# Patient Record
Sex: FEMALE | URBAN - METROPOLITAN AREA
[De-identification: names, ages, dates, MRNs, and addresses within clinical notes are randomized per-mention and may not be internally consistent; named-entity substitution may affect disease eponyms.]

---

## 2018-07-20 ENCOUNTER — APPOINTMENT (RX ONLY)
Dept: URBAN - METROPOLITAN AREA CLINIC 157 | Facility: CLINIC | Age: 83
Setting detail: DERMATOLOGY
End: 2018-07-20

## 2018-07-20 DIAGNOSIS — L57.0 ACTINIC KERATOSIS: ICD-10-CM

## 2018-07-20 DIAGNOSIS — Z85.828 PERSONAL HISTORY OF OTHER MALIGNANT NEOPLASM OF SKIN: ICD-10-CM

## 2018-07-20 PROBLEM — E78.5 HYPERLIPIDEMIA, UNSPECIFIED: Status: ACTIVE | Noted: 2018-07-20

## 2018-07-20 PROBLEM — I10 ESSENTIAL (PRIMARY) HYPERTENSION: Status: ACTIVE | Noted: 2018-07-20

## 2018-07-20 PROBLEM — D48.5 NEOPLASM OF UNCERTAIN BEHAVIOR OF SKIN: Status: ACTIVE | Noted: 2018-07-20

## 2018-07-20 PROBLEM — E13.9 OTHER SPECIFIED DIABETES MELLITUS WITHOUT COMPLICATIONS: Status: ACTIVE | Noted: 2018-07-20

## 2018-07-20 PROCEDURE — 17000 DESTRUCT PREMALG LESION: CPT

## 2018-07-20 PROCEDURE — ? COUNSELING

## 2018-07-20 PROCEDURE — 99201: CPT | Mod: 25

## 2018-07-20 PROCEDURE — ? BIOPSY BY SHAVE METHOD

## 2018-07-20 PROCEDURE — ? LIQUID NITROGEN

## 2018-07-20 PROCEDURE — 11100: CPT | Mod: 59

## 2018-07-20 ASSESSMENT — LOCATION SIMPLE DESCRIPTION DERM: LOCATION SIMPLE: RIGHT FOREHEAD

## 2018-07-20 ASSESSMENT — LOCATION DETAILED DESCRIPTION DERM: LOCATION DETAILED: RIGHT INFERIOR LATERAL FOREHEAD

## 2018-07-20 ASSESSMENT — LOCATION ZONE DERM: LOCATION ZONE: FACE

## 2018-07-20 NOTE — PROCEDURE: BIOPSY BY SHAVE METHOD
Post-Care Instructions: I reviewed with the patient in detail post-care instructions. Patient is to keep the biopsy site dry overnight, and then apply bacitracin twice daily until healed. Patient may apply hydrogen peroxide soaks to remove any crusting.
Additional Anesthesia Volume In Cc (Will Not Render If 0): 0
Dressing: Band-Aid
Anesthesia Type: 1% lidocaine with epinephrine
Bill 55834 For Specimen Handling/Conveyance To Laboratory?: no
Electrodesiccation Text: The wound bed was treated with electrodesiccation after the biopsy was performed.
Curettage Text: The wound bed was treated with curettage after the biopsy was performed.
Lab: 3
Type Of Destruction Used: Curettage
Biopsy Type: H and E
Notification Instructions: Patient will be notified of biopsy results. However, patient instructed to call the office if not contacted within 2 weeks.
Was A Bandage Applied: Yes
Wound Care: Petrolatum
Cryotherapy Text: The wound bed was treated with cryotherapy after the biopsy was performed.
Electrodesiccation And Curettage Text: The wound bed was treated with electrodesiccation and curettage after the biopsy was performed.
Detail Level: Detailed
Depth Of Biopsy: dermis
Biopsy Method: double edge Personna blade
Billing Type: Third-Party Bill
Anesthesia Volume In Cc (Will Not Render If 0): 1
Consent: Written consent was obtained and risks were reviewed including but not limited to scarring, infection, bleeding, scabbing, incomplete removal, nerve damage and allergy to anesthesia.
Silver Nitrate Text: The wound bed was treated with silver nitrate after the biopsy was performed.
Hemostasis: Aluminum Chloride

## 2018-07-20 NOTE — PROCEDURE: LIQUID NITROGEN
Duration Of Freeze Thaw-Cycle (Seconds): 0
Post-Care Instructions: I reviewed with the patient in detail post-care instructions. Patient is to wear sunprotection, and avoid picking at any of the treated lesions. Pt may apply Vaseline to crusted or scabbing areas.
Number Of Freeze-Thaw Cycles: 2 freeze-thaw cycles
Consent: The patient's consent was obtained including but not limited to risks of crusting, scabbing, blistering, scarring, darker or lighter pigmentary change, recurrence, incomplete removal and infection.
Render Post-Care Instructions In Note?: no
Detail Level: Detailed

## 2018-07-20 NOTE — HPI: SKIN LESION
Is This A New Presentation, Or A Follow-Up?: Skin Lesion
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 This spot is just below Mohs site per pt.

## 2018-08-21 ENCOUNTER — APPOINTMENT (RX ONLY)
Dept: URBAN - METROPOLITAN AREA CLINIC 157 | Facility: CLINIC | Age: 83
Setting detail: DERMATOLOGY
End: 2018-08-21

## 2018-08-21 DIAGNOSIS — L81.4 OTHER MELANIN HYPERPIGMENTATION: ICD-10-CM

## 2018-08-21 DIAGNOSIS — L57.0 ACTINIC KERATOSIS: ICD-10-CM

## 2018-08-21 PROBLEM — C44.319 BASAL CELL CARCINOMA OF SKIN OF OTHER PARTS OF FACE: Status: ACTIVE | Noted: 2018-08-21

## 2018-08-21 PROCEDURE — ? DEFER

## 2018-08-21 PROCEDURE — ? COUNSELING

## 2018-08-21 PROCEDURE — 17000 DESTRUCT PREMALG LESION: CPT

## 2018-08-21 PROCEDURE — ? LIQUID NITROGEN

## 2018-08-21 PROCEDURE — 99213 OFFICE O/P EST LOW 20 MIN: CPT | Mod: 25

## 2018-08-21 ASSESSMENT — LOCATION SIMPLE DESCRIPTION DERM
LOCATION SIMPLE: CHEST
LOCATION SIMPLE: UPPER BACK
LOCATION SIMPLE: RIGHT FOREHEAD

## 2018-08-21 ASSESSMENT — LOCATION DETAILED DESCRIPTION DERM
LOCATION DETAILED: SUPERIOR THORACIC SPINE
LOCATION DETAILED: RIGHT FOREHEAD
LOCATION DETAILED: RIGHT LATERAL SUPERIOR CHEST

## 2018-08-21 ASSESSMENT — LOCATION ZONE DERM
LOCATION ZONE: FACE
LOCATION ZONE: TRUNK

## 2018-08-21 NOTE — PROCEDURE: LIQUID NITROGEN
Number Of Freeze-Thaw Cycles: 2 freeze-thaw cycles
Post-Care Instructions: I reviewed with the patient in detail post-care instructions. Patient is to wear sunprotection, and avoid picking at any of the treated lesions. Pt may apply Vaseline to crusted or scabbing areas.
Duration Of Freeze Thaw-Cycle (Seconds): 0
Consent: The patient's consent was obtained including but not limited to risks of crusting, scabbing, blistering, scarring, darker or lighter pigmentary change, recurrence, incomplete removal and infection.
Detail Level: Detailed
Render Post-Care Instructions In Note?: no

## 2018-08-23 ENCOUNTER — APPOINTMENT (RX ONLY)
Dept: URBAN - METROPOLITAN AREA CLINIC 156 | Facility: CLINIC | Age: 83
Setting detail: DERMATOLOGY
End: 2018-08-23

## 2018-08-23 VITALS — DIASTOLIC BLOOD PRESSURE: 70 MMHG | HEART RATE: 78 BPM | RESPIRATION RATE: 14 BRPM | SYSTOLIC BLOOD PRESSURE: 118 MMHG

## 2018-08-23 PROBLEM — D48.5 NEOPLASM OF UNCERTAIN BEHAVIOR OF SKIN: Status: ACTIVE | Noted: 2018-08-23

## 2018-08-23 PROBLEM — C44.319 BASAL CELL CARCINOMA OF SKIN OF OTHER PARTS OF FACE: Status: ACTIVE | Noted: 2018-08-23

## 2018-08-23 PROCEDURE — ? BIOPSY BY SHAVE METHOD

## 2018-08-23 PROCEDURE — ? CONSULTATION FOR MOHS SURGERY

## 2018-08-23 PROCEDURE — 11101: CPT | Mod: 79

## 2018-08-23 PROCEDURE — 99213 OFFICE O/P EST LOW 20 MIN: CPT | Mod: 24,25

## 2018-08-23 PROCEDURE — 11100: CPT | Mod: 79

## 2018-08-23 NOTE — PROCEDURE: BIOPSY BY SHAVE METHOD
Triangulation (Location Of Lesion In Relation To Distance From Anatomical Landmarks): Lesion placed in correctly labeled bottle and verified by provider.
Depth Of Biopsy: dermis
Body Location Override (Optional - Billing Will Still Be Based On Selected Body Map Location If Applicable): left temple
Type Of Destruction Used: Curettage
Biopsy Method: Dermablade
Hemostasis: Electrocautery
Additional Anesthesia Volume In Cc (Will Not Render If 0): 0
Path Notes (To The Dermatopathologist): 0.9 x 0.7cm
Biopsy Type: H and E
Bill [id number] For Specimen Handling/Conveyance To Laboratory?: no
Was A Bandage Applied: Yes
Anesthesia Type: 1% lidocaine with epinephrine
Lab Facility: 1
Lab: 3
Post-Care Instructions: I reviewed with the patient in detail post-care instructions. Patient is to keep the biopsy site dry overnight, and then apply bacitracin twice daily until healed. Patient may apply hydrogen peroxide soaks to remove any crusting.
Wound Care: Petrolatum
Electrodesiccation Text: The wound bed was treated with electrodesiccation after the biopsy was performed.
Anesthesia Volume In Cc (Will Not Render If 0): 0.5
Cryotherapy Text: The wound bed was treated with cryotherapy after the biopsy was performed.
Size Of Lesion In Cm: 0.9
Dressing: bandage
Billing Type: Third-Party Bill
Notification Instructions: Patient will be notified of biopsy results. However, patient instructed to call the office if not contacted within 2 weeks.
Consent: Written consent was obtained and risks were reviewed including but not limited to scarring, infection, bleeding, scabbing, incomplete removal, nerve damage and allergy to anesthesia.
Curettage Text: The wound bed was treated with curettage after the biopsy was performed.
Detail Level: Detailed
Electrodesiccation And Curettage Text: The wound bed was treated with electrodesiccation and curettage after the biopsy was performed.
X Size Of Lesion In Cm: 0.7
Silver Nitrate Text: The wound bed was treated with silver nitrate after the biopsy was performed.
Path Notes (To The Dermatopathologist): 1.0 x 0.8cm
X Size Of Lesion In Cm: 0.8
Body Location Override (Optional - Billing Will Still Be Based On Selected Body Map Location If Applicable): left lateral brow
Body Location Override (Optional - Billing Will Still Be Based On Selected Body Map Location If Applicable): left lateral canthus
Path Notes (To The Dermatopathologist): 0.8 x 0.5 cm
Path Notes (To The Dermatopathologist): 1.1 x 1.0cm
Body Location Override (Optional - Billing Will Still Be Based On Selected Body Map Location If Applicable): left lateral forehead
Size Of Lesion In Cm: 1.1

## 2018-08-23 NOTE — HPI: MOHS SURGERY CONSULTATION
Has The Cancer Been Biopsied Before?: has been previously biopsied
Body Location Override (Optional): Left lateral temple
Who Is Your Referring Provider?: BERTA BA

## 2018-08-23 NOTE — PROCEDURE: CONSULTATION FOR MOHS SURGERY
Body Location Override (Optional - Billing Will Still Be Based On Selected Body Map Location If Applicable): left lateral temple
Size Of Lesion: 4.3
Location Indication Override (Is Already Calculated Based On Selected Body Location): Area H
Name Of The Referring Provider For Procedure: Felipe
Incorporate Mauc In Note: Yes
X Size Of Lesion In Cm (Optional): 2
Detail Level: Detailed

## 2018-09-13 ENCOUNTER — APPOINTMENT (RX ONLY)
Dept: URBAN - METROPOLITAN AREA CLINIC 156 | Facility: CLINIC | Age: 83
Setting detail: DERMATOLOGY
End: 2018-09-13

## 2018-09-13 DIAGNOSIS — L57.0 ACTINIC KERATOSIS: ICD-10-CM

## 2018-09-13 PROBLEM — C44.319 BASAL CELL CARCINOMA OF SKIN OF OTHER PARTS OF FACE: Status: ACTIVE | Noted: 2018-09-13

## 2018-09-13 PROBLEM — C44.329 SQUAMOUS CELL CARCINOMA OF SKIN OF OTHER PARTS OF FACE: Status: ACTIVE | Noted: 2018-09-13

## 2018-09-13 PROBLEM — D04.39 CARCINOMA IN SITU OF SKIN OF OTHER PARTS OF FACE: Status: ACTIVE | Noted: 2018-09-13

## 2018-09-13 PROCEDURE — 99213 OFFICE O/P EST LOW 20 MIN: CPT

## 2018-09-13 PROCEDURE — ? DEFER

## 2018-09-13 PROCEDURE — ? CONSULTATION FOR MOHS SURGERY

## 2018-09-13 ASSESSMENT — LOCATION ZONE DERM: LOCATION ZONE: FACE

## 2018-09-13 ASSESSMENT — LOCATION DETAILED DESCRIPTION DERM
LOCATION DETAILED: LEFT CENTRAL ZYGOMA
LOCATION DETAILED: LEFT MEDIAL ZYGOMA

## 2018-09-13 ASSESSMENT — LOCATION SIMPLE DESCRIPTION DERM: LOCATION SIMPLE: LEFT ZYGOMA

## 2018-09-13 NOTE — PROCEDURE: DEFER
Body Location Override (Optional - Billing Will Still Be Based On Selected Body Map Location If Applicable): left temple
Procedure To Be Performed At Next Visit: Cryotherapy
Detail Level: Detailed
Body Location Override (Optional - Billing Will Still Be Based On Selected Body Map Location If Applicable): left lateral canthus

## 2018-09-13 NOTE — PROCEDURE: CONSULTATION FOR MOHS SURGERY
Incorporate Mauc In Note: Yes
Location Indication Override (Is Already Calculated Based On Selected Body Location): Area H
Size Of Lesion: 1
Detail Level: Detailed
X Size Of Lesion In Cm (Optional): 0.8
Body Location Override (Optional - Billing Will Still Be Based On Selected Body Map Location If Applicable): left lateral brow
Size Of Lesion: 1.1
Location Indication Override (Is Already Calculated Based On Selected Body Location): Area M
Body Location Override (Optional - Billing Will Still Be Based On Selected Body Map Location If Applicable): left lateral forehead
Body Location Override (Optional - Billing Will Still Be Based On Selected Body Map Location If Applicable): left lateral temple
Size Of Lesion: 4.3
Name Of The Referring Provider For Procedure: ANTHONY Wang
X Size Of Lesion In Cm (Optional): 2

## 2018-10-17 ENCOUNTER — APPOINTMENT (RX ONLY)
Dept: URBAN - METROPOLITAN AREA CLINIC 156 | Facility: CLINIC | Age: 83
Setting detail: DERMATOLOGY
End: 2018-10-17

## 2018-10-17 VITALS — HEART RATE: 82 BPM | SYSTOLIC BLOOD PRESSURE: 120 MMHG | RESPIRATION RATE: 16 BRPM | DIASTOLIC BLOOD PRESSURE: 78 MMHG

## 2018-10-17 DIAGNOSIS — L57.0 ACTINIC KERATOSIS: ICD-10-CM

## 2018-10-17 PROBLEM — D04.39 CARCINOMA IN SITU OF SKIN OF OTHER PARTS OF FACE: Status: ACTIVE | Noted: 2018-10-17

## 2018-10-17 PROBLEM — C44.329 SQUAMOUS CELL CARCINOMA OF SKIN OF OTHER PARTS OF FACE: Status: ACTIVE | Noted: 2018-10-17

## 2018-10-17 PROBLEM — C44.319 BASAL CELL CARCINOMA OF SKIN OF OTHER PARTS OF FACE: Status: ACTIVE | Noted: 2018-10-17

## 2018-10-17 PROCEDURE — ? DEFER

## 2018-10-17 PROCEDURE — ? CONSULTATION FOR MOHS SURGERY

## 2018-10-17 PROCEDURE — 99213 OFFICE O/P EST LOW 20 MIN: CPT

## 2018-10-17 ASSESSMENT — LOCATION ZONE DERM: LOCATION ZONE: FACE

## 2018-10-17 ASSESSMENT — LOCATION DETAILED DESCRIPTION DERM
LOCATION DETAILED: LEFT MID TEMPLE
LOCATION DETAILED: LEFT INFERIOR TEMPLE

## 2018-10-17 ASSESSMENT — LOCATION SIMPLE DESCRIPTION DERM: LOCATION SIMPLE: LEFT TEMPLE

## 2018-10-17 NOTE — PROCEDURE: CONSULTATION FOR MOHS SURGERY
Location Indication Override (Is Already Calculated Based On Selected Body Location): Area H
Size Of Lesion: 4.3
Incorporate Mauc In Note: Yes
Detail Level: Detailed
X Size Of Lesion In Cm (Optional): 2
Body Location Override (Optional - Billing Will Still Be Based On Selected Body Map Location If Applicable): left lateral temple
Body Location Override (Optional - Billing Will Still Be Based On Selected Body Map Location If Applicable): left lateral forehead
Size Of Lesion: 1.1
X Size Of Lesion In Cm (Optional): 1
Location Indication Override (Is Already Calculated Based On Selected Body Location): Area M
Body Location Override (Optional - Billing Will Still Be Based On Selected Body Map Location If Applicable): left lateral brow
X Size Of Lesion In Cm (Optional): 0.8

## 2018-10-17 NOTE — PROCEDURE: DEFER
Procedure To Be Performed At Next Visit: Liquid nitrogen
Detail Level: Detailed
Body Location Override (Optional - Billing Will Still Be Based On Selected Body Map Location If Applicable): left lateral canthus
Body Location Override (Optional - Billing Will Still Be Based On Selected Body Map Location If Applicable): left temple

## 2018-10-25 ENCOUNTER — APPOINTMENT (RX ONLY)
Dept: URBAN - METROPOLITAN AREA CLINIC 156 | Facility: CLINIC | Age: 83
Setting detail: DERMATOLOGY
End: 2018-10-25

## 2018-10-25 DIAGNOSIS — L57.0 ACTINIC KERATOSIS: ICD-10-CM

## 2018-10-25 PROCEDURE — 17003 DESTRUCT PREMALG LES 2-14: CPT

## 2018-10-25 PROCEDURE — 17000 DESTRUCT PREMALG LESION: CPT

## 2018-10-25 PROCEDURE — ? LIQUID NITROGEN

## 2018-10-25 ASSESSMENT — LOCATION SIMPLE DESCRIPTION DERM
LOCATION SIMPLE: LEFT TEMPLE
LOCATION SIMPLE: LEFT LATERAL CANTHUS

## 2018-10-25 ASSESSMENT — LOCATION ZONE DERM
LOCATION ZONE: FACE
LOCATION ZONE: EYELID

## 2018-10-25 ASSESSMENT — LOCATION DETAILED DESCRIPTION DERM
LOCATION DETAILED: LEFT LATERAL CANTHUS
LOCATION DETAILED: LEFT CENTRAL TEMPLE

## 2021-06-02 ENCOUNTER — APPOINTMENT (RX ONLY)
Dept: URBAN - METROPOLITAN AREA CLINIC 156 | Facility: CLINIC | Age: 86
Setting detail: DERMATOLOGY
End: 2021-06-02

## 2021-06-02 DIAGNOSIS — L24.4 IRRITANT CONTACT DERMATITIS DUE TO DRUGS IN CONTACT WITH SKIN: ICD-10-CM

## 2021-06-02 PROCEDURE — ? PRESCRIPTION MEDICATION MANAGEMENT

## 2021-06-02 PROCEDURE — ? COUNSELING

## 2021-06-02 PROCEDURE — 99213 OFFICE O/P EST LOW 20 MIN: CPT

## 2021-06-02 PROCEDURE — ? MEDICATION COUNSELING

## 2021-06-02 PROCEDURE — ? ADDITIONAL NOTES

## 2021-06-02 PROCEDURE — ? PRESCRIPTION

## 2021-06-02 RX ORDER — HYDROCORTISONE 25 MG/G
CREAM TOPICAL
Qty: 1 | Refills: 1 | Status: ERX | COMMUNITY
Start: 2021-06-02

## 2021-06-02 RX ADMIN — HYDROCORTISONE: 25 CREAM TOPICAL at 00:00

## 2021-06-02 ASSESSMENT — LOCATION DETAILED DESCRIPTION DERM: LOCATION DETAILED: NASAL DORSUM

## 2021-06-02 ASSESSMENT — LOCATION ZONE DERM: LOCATION ZONE: NOSE

## 2021-06-02 ASSESSMENT — LOCATION SIMPLE DESCRIPTION DERM: LOCATION SIMPLE: NOSE

## 2021-06-02 NOTE — PROCEDURE: MEDICATION COUNSELING
Xelkendellz Pregnancy And Lactation Text: This medication is Pregnancy Category D and is not considered safe during pregnancy.  The risk during breast feeding is also uncertain.

## 2023-01-16 ENCOUNTER — APPOINTMENT (RX ONLY)
Dept: URBAN - METROPOLITAN AREA CLINIC 156 | Facility: CLINIC | Age: 88
Setting detail: DERMATOLOGY
End: 2023-01-16

## 2023-01-16 DIAGNOSIS — L81.4 OTHER MELANIN HYPERPIGMENTATION: ICD-10-CM

## 2023-01-16 DIAGNOSIS — L21.8 OTHER SEBORRHEIC DERMATITIS: ICD-10-CM | Status: INADEQUATELY CONTROLLED

## 2023-01-16 DIAGNOSIS — L57.0 ACTINIC KERATOSIS: ICD-10-CM

## 2023-01-16 PROBLEM — D48.5 NEOPLASM OF UNCERTAIN BEHAVIOR OF SKIN: Status: ACTIVE | Noted: 2023-01-16

## 2023-01-16 PROCEDURE — ? LIQUID NITROGEN

## 2023-01-16 PROCEDURE — 11102 TANGNTL BX SKIN SINGLE LES: CPT

## 2023-01-16 PROCEDURE — 17003 DESTRUCT PREMALG LES 2-14: CPT

## 2023-01-16 PROCEDURE — ? BIOPSY BY SHAVE METHOD

## 2023-01-16 PROCEDURE — ? COUNSELING

## 2023-01-16 PROCEDURE — ? PRESCRIPTION MEDICATION MANAGEMENT

## 2023-01-16 PROCEDURE — 17000 DESTRUCT PREMALG LESION: CPT | Mod: 59

## 2023-01-16 PROCEDURE — ? PRESCRIPTION

## 2023-01-16 PROCEDURE — ? ADDITIONAL NOTES

## 2023-01-16 PROCEDURE — 99214 OFFICE O/P EST MOD 30 MIN: CPT | Mod: 25

## 2023-01-16 PROCEDURE — ? SUNSCREEN RECOMMENDATIONS

## 2023-01-16 RX ORDER — HYDROCORTISONE 25 MG/G
CREAM TOPICAL
Qty: 30 | Refills: 1 | Status: ERX

## 2023-01-16 ASSESSMENT — LOCATION DETAILED DESCRIPTION DERM
LOCATION DETAILED: RIGHT SUPERIOR CENTRAL MALAR CHEEK
LOCATION DETAILED: RIGHT INFERIOR LATERAL FOREHEAD
LOCATION DETAILED: RIGHT SUPERIOR PREAURICULAR CHEEK
LOCATION DETAILED: RIGHT SUPERIOR LATERAL MALAR CHEEK
LOCATION DETAILED: LEFT LATERAL EYEBROW
LOCATION DETAILED: RIGHT CENTRAL MALAR CHEEK
LOCATION DETAILED: LEFT MEDIAL FOREHEAD

## 2023-01-16 ASSESSMENT — LOCATION SIMPLE DESCRIPTION DERM
LOCATION SIMPLE: LEFT FOREHEAD
LOCATION SIMPLE: LEFT EYEBROW
LOCATION SIMPLE: RIGHT CHEEK
LOCATION SIMPLE: RIGHT FOREHEAD

## 2023-01-16 ASSESSMENT — LOCATION ZONE DERM: LOCATION ZONE: FACE

## 2023-01-16 NOTE — PROCEDURE: BIOPSY BY SHAVE METHOD
Body Location Override (Optional - Billing Will Still Be Based On Selected Body Map Location If Applicable): Lt upper lateral forehead
Detail Level: Detailed
Depth Of Biopsy: dermis
Was A Bandage Applied: Yes
Size Of Lesion In Cm: 0.8
X Size Of Lesion In Cm: 0
Biopsy Type: H and E
Biopsy Method: double edge Personna blade
Anesthesia Type: 2% lidocaine without epinephrine
Anesthesia Volume In Cc (Will Not Render If 0): 0.5
Hemostasis: Electrocautery
Wound Care: Vaseline
Dressing: Band-Aid
Destruction After The Procedure: No
Type Of Destruction Used: Curettage
Curettage Text: The wound bed was treated with curettage after the biopsy was performed.
Cryotherapy Text: The wound bed was treated with cryotherapy after the biopsy was performed.
Electrodesiccation Text: The wound bed was treated with electrodesiccation after the biopsy was performed.
Electrodesiccation And Curettage Text: The wound bed was treated with electrodesiccation and curettage after the biopsy was performed.
Silver Nitrate Text: The wound bed was treated with silver nitrate after the biopsy was performed.
Lab: 6
Consent: Written consent was obtained and risks were reviewed including but not limited to scarring, infection, bleeding, scabbing, incomplete removal, nerve damage and allergy to anesthesia.
Post-Care Instructions: I reviewed with the patient in detail post-care instructions. Patient is to keep the biopsy site dry overnight, and then apply bacitracin twice daily until healed. Patient may apply hydrogen peroxide soaks to remove any crusting.
Notification Instructions: Patient will be notified of biopsy results. However, patient instructed to call the office if not contacted within 2 weeks.
Billing Type: Third-Party Bill
Information: Selecting Yes will display possible errors in your note based on the variables you have selected. This validation is only offered as a suggestion for you. PLEASE NOTE THAT THE VALIDATION TEXT WILL BE REMOVED WHEN YOU FINALIZE YOUR NOTE. IF YOU WANT TO FAX A PRELIMINARY NOTE YOU WILL NEED TO TOGGLE THIS TO 'NO' IF YOU DO NOT WANT IT IN YOUR FAXED NOTE.

## 2023-01-16 NOTE — PROCEDURE: MIPS QUALITY
Quality 111:Pneumonia Vaccination Status For Older Adults: Pneumococcal vaccine (PPSV23) administered on or after patient’s 60th birthday and before the end of the measurement period
Quality 226: Preventive Care And Screening: Tobacco Use: Screening And Cessation Intervention: Patient screened for tobacco use and is an ex/non-smoker
Detail Level: Detailed
Quality 130: Documentation Of Current Medications In The Medical Record: Current Medications Documented
Quality 110: Preventive Care And Screening: Influenza Immunization: Influenza Immunization Administered during Influenza season
Quality 47: Advance Care Plan: Advance Care Planning discussed and documented; advance care plan or surrogate decision maker documented in the medical record.
Quality 431: Preventive Care And Screening: Unhealthy Alcohol Use - Screening: Patient not identified as an unhealthy alcohol user when screened for unhealthy alcohol use using a systematic screening method

## 2023-01-16 NOTE — PROCEDURE: LIQUID NITROGEN
Number Of Freeze-Thaw Cycles: 1 freeze-thaw cycle
Render Post-Care Instructions In Note?: no
Consent: The patient's consent was obtained including but not limited to risks of crusting, scabbing, blistering, scarring, darker or lighter pigmentary change, recurrence, incomplete removal and infection.
Show Aperture Variable?: Yes
Duration Of Freeze Thaw-Cycle (Seconds): 0
Post-Care Instructions: I reviewed with the patient in detail post-care instructions. Patient is to wear sunprotection, and avoid picking at any of the treated lesions. Pt may apply Vaseline to crusted or scabbing areas.
Detail Level: Simple

## 2023-02-21 ENCOUNTER — APPOINTMENT (RX ONLY)
Dept: URBAN - METROPOLITAN AREA CLINIC 156 | Facility: CLINIC | Age: 88
Setting detail: DERMATOLOGY
End: 2023-02-21

## 2023-02-21 PROBLEM — C44.319 BASAL CELL CARCINOMA OF SKIN OF OTHER PARTS OF FACE: Status: ACTIVE | Noted: 2023-02-21

## 2023-02-21 PROCEDURE — ? MOHS SURGERY

## 2023-02-21 PROCEDURE — ? REPAIR NOTE

## 2023-02-21 PROCEDURE — ? PRESCRIPTION

## 2023-02-21 PROCEDURE — 17311 MOHS 1 STAGE H/N/HF/G: CPT

## 2023-02-21 PROCEDURE — 14041 TIS TRNFR F/C/C/M/N/A/G/H/F: CPT

## 2023-02-21 PROCEDURE — 17312 MOHS ADDL STAGE: CPT

## 2023-02-21 RX ORDER — MUPIROCIN 20 MG/G
OINTMENT TOPICAL
Qty: 15 | Refills: 0 | Status: ERX | COMMUNITY
Start: 2023-02-21

## 2023-02-21 RX ADMIN — MUPIROCIN: 20 OINTMENT TOPICAL at 00:00

## 2023-02-21 NOTE — PROCEDURE: MOHS SURGERY
Body Location Override (Optional - Billing Will Still Be Based On Selected Body Map Location If Applicable): Lt upper lateral forehead

## 2023-03-07 ENCOUNTER — APPOINTMENT (RX ONLY)
Dept: URBAN - METROPOLITAN AREA CLINIC 156 | Facility: CLINIC | Age: 88
Setting detail: DERMATOLOGY
End: 2023-03-07

## 2023-03-07 DIAGNOSIS — Z48.817 ENCOUNTER FOR SURGICAL AFTERCARE FOLLOWING SURGERY ON THE SKIN AND SUBCUTANEOUS TISSUE: ICD-10-CM

## 2023-03-07 PROCEDURE — ? POST-OP WOUND CHECK

## 2023-03-07 PROCEDURE — ? PHOTO-DOCUMENTATION

## 2023-03-07 PROCEDURE — 99024 POSTOP FOLLOW-UP VISIT: CPT

## 2023-03-07 ASSESSMENT — LOCATION DETAILED DESCRIPTION DERM: LOCATION DETAILED: LEFT SUPERIOR LATERAL FOREHEAD

## 2023-03-07 ASSESSMENT — LOCATION SIMPLE DESCRIPTION DERM: LOCATION SIMPLE: LEFT FOREHEAD

## 2023-03-07 ASSESSMENT — LOCATION ZONE DERM: LOCATION ZONE: FACE

## 2023-03-07 NOTE — PROCEDURE: POST-OP WOUND CHECK
Detail Level: Detailed
Add 69719 Cpt? (Important Note: In 2017 The Use Of 93161 Is Being Tracked By Cms To Determine Future Global Period Reimbursement For Global Periods): yes
Wound Evaluated By: Dr Charles

## 2023-03-21 ENCOUNTER — APPOINTMENT (RX ONLY)
Dept: URBAN - METROPOLITAN AREA CLINIC 156 | Facility: CLINIC | Age: 88
Setting detail: DERMATOLOGY
End: 2023-03-21

## 2023-03-21 DIAGNOSIS — Z48.817 ENCOUNTER FOR SURGICAL AFTERCARE FOLLOWING SURGERY ON THE SKIN AND SUBCUTANEOUS TISSUE: ICD-10-CM

## 2023-03-21 PROCEDURE — ? PHOTO-DOCUMENTATION

## 2023-03-21 PROCEDURE — ? POST-OP WOUND CHECK

## 2023-03-21 PROCEDURE — 99024 POSTOP FOLLOW-UP VISIT: CPT

## 2023-03-21 ASSESSMENT — LOCATION ZONE DERM: LOCATION ZONE: FACE

## 2023-03-21 ASSESSMENT — LOCATION SIMPLE DESCRIPTION DERM: LOCATION SIMPLE: LEFT FOREHEAD

## 2023-03-21 ASSESSMENT — LOCATION DETAILED DESCRIPTION DERM: LOCATION DETAILED: LEFT SUPERIOR LATERAL FOREHEAD

## 2023-03-21 NOTE — PROCEDURE: POST-OP WOUND CHECK
Detail Level: Detailed
Add 89540 Cpt? (Important Note: In 2017 The Use Of 98176 Is Being Tracked By Cms To Determine Future Global Period Reimbursement For Global Periods): yes
Wound Evaluated By: Dr Charles

## 2024-05-03 ENCOUNTER — APPOINTMENT (RX ONLY)
Dept: URBAN - METROPOLITAN AREA CLINIC 156 | Facility: CLINIC | Age: 89
Setting detail: DERMATOLOGY
End: 2024-05-03

## 2024-05-03 DIAGNOSIS — L81.4 OTHER MELANIN HYPERPIGMENTATION: ICD-10-CM

## 2024-05-03 DIAGNOSIS — L82.1 OTHER SEBORRHEIC KERATOSIS: ICD-10-CM

## 2024-05-03 PROBLEM — D48.5 NEOPLASM OF UNCERTAIN BEHAVIOR OF SKIN: Status: ACTIVE | Noted: 2024-05-03

## 2024-05-03 PROCEDURE — 11102 TANGNTL BX SKIN SINGLE LES: CPT

## 2024-05-03 PROCEDURE — ? COUNSELING

## 2024-05-03 PROCEDURE — ? BIOPSY BY SHAVE METHOD

## 2024-05-03 PROCEDURE — ? SUNSCREEN RECOMMENDATIONS

## 2024-05-03 PROCEDURE — 99213 OFFICE O/P EST LOW 20 MIN: CPT | Mod: 25

## 2024-05-03 ASSESSMENT — LOCATION DETAILED DESCRIPTION DERM
LOCATION DETAILED: LEFT SUPERIOR ANTERIOR NECK
LOCATION DETAILED: RIGHT INFERIOR LATERAL NECK

## 2024-05-03 ASSESSMENT — LOCATION SIMPLE DESCRIPTION DERM
LOCATION SIMPLE: LEFT ANTERIOR NECK
LOCATION SIMPLE: RIGHT ANTERIOR NECK

## 2024-05-03 ASSESSMENT — LOCATION ZONE DERM: LOCATION ZONE: NECK

## 2024-05-03 NOTE — PROCEDURE: BIOPSY BY SHAVE METHOD
Body Location Override (Optional - Billing Will Still Be Based On Selected Body Map Location If Applicable): Right lateral neck
Detail Level: Detailed
Depth Of Biopsy: dermis
Was A Bandage Applied: Yes
Size Of Lesion In Cm: 1.2
X Size Of Lesion In Cm: 0
Biopsy Type: H and E
Biopsy Method: double edge Personna blade
Anesthesia Type: 2% lidocaine without epinephrine
Anesthesia Volume In Cc: 0.5
Hemostasis: Electrocautery
Wound Care: Vaseline
Dressing: Band-Aid
Destruction After The Procedure: No
Type Of Destruction Used: Curettage
Curettage Text: The wound bed was treated with curettage after the biopsy was performed.
Cryotherapy Text: The wound bed was treated with cryotherapy after the biopsy was performed.
Electrodesiccation Text: The wound bed was treated with electrodesiccation after the biopsy was performed.
Electrodesiccation And Curettage Text: The wound bed was treated with electrodesiccation and curettage after the biopsy was performed.
Silver Nitrate Text: The wound bed was treated with silver nitrate after the biopsy was performed.
Lab: 6
Consent: Written consent was obtained and risks were reviewed including but not limited to scarring, infection, bleeding, scabbing, incomplete removal, nerve damage and allergy to anesthesia.
Post-Care Instructions: I reviewed with the patient in detail post-care instructions. Patient is to keep the biopsy site dry overnight, and then apply bacitracin twice daily until healed. Patient may apply hydrogen peroxide soaks to remove any crusting.
Notification Instructions: Patient will be notified of biopsy results. However, patient instructed to call the office if not contacted within 2 weeks.
Billing Type: Third-Party Bill
Information: Selecting Yes will display possible errors in your note based on the variables you have selected. This validation is only offered as a suggestion for you. PLEASE NOTE THAT THE VALIDATION TEXT WILL BE REMOVED WHEN YOU FINALIZE YOUR NOTE. IF YOU WANT TO FAX A PRELIMINARY NOTE YOU WILL NEED TO TOGGLE THIS TO 'NO' IF YOU DO NOT WANT IT IN YOUR FAXED NOTE.

## 2024-05-17 ENCOUNTER — APPOINTMENT (RX ONLY)
Dept: URBAN - METROPOLITAN AREA CLINIC 156 | Facility: CLINIC | Age: 89
Setting detail: DERMATOLOGY
End: 2024-05-17

## 2024-05-17 PROBLEM — C44.41 BASAL CELL CARCINOMA OF SKIN OF SCALP AND NECK: Status: ACTIVE | Noted: 2024-05-17

## 2024-05-17 PROCEDURE — ? CRYOSURGICAL DESTRUCTION

## 2024-05-17 PROCEDURE — ? PATHOLOGY DISCUSSION

## 2024-05-17 PROCEDURE — ? COUNSELING

## 2024-05-17 PROCEDURE — 17272 DSTR MAL LES S/N/H/F/G 1.1-2: CPT

## 2024-05-17 NOTE — PROCEDURE: CRYOSURGICAL DESTRUCTION
Body Location Override (Optional - Billing Will Still Be Based On Selected Body Map Location If Applicable): Rt lateral neck
Detail Level: Detailed
Size Of Lesion In Cm: 1.6
Add Intralesional Injection: No
Medication Injected: 5-Fluorouracil
Concentration (Mg/Ml Or Millions Of Plaque Forming Units/Cc): 0.01
Total Volume (Ccs): 1
Anesthesia Volume In Cc: 0
Number Of Freeze-Thaw Cycles: 2 freeze-thaw cycles
Total Time In Minutes: 3 minutes
Pre-Procedure: The surgical site was antiseptically prepared.
Consent was obtained from the patient. The risks and benefits to therapy were discussed in detail. Specifically, the risks of infection, scarring, bleeding, prolonged wound healing, incomplete removal, allergy to anesthesia, nerve injury and recurrence were addressed. Alternatives to liquid nitrogen, such as ED&C, surgical removal, XRT were also discussed.  Prior to the procedure, the treatment site was clearly identified and confirmed by the patient. All components of Universal Protocol/PAUSE Rule completed.
Render Post-Care In The Note: Yes
Post-Care Instructions: I reviewed with the patient in detail post-care instructions. Patient is to keep the area dry for 48 hours, and not to engage in any heavy lifting, exercise, or swimming for the next 14 days. Should the patient develop any fevers, chills, bleeding, severe pain patient will contact the office immediately.
Bill As A Line Item Or As Units: Line Item

## 2025-05-20 ENCOUNTER — APPOINTMENT (OUTPATIENT)
Dept: URBAN - METROPOLITAN AREA CLINIC 156 | Facility: CLINIC | Age: OVER 89
Setting detail: DERMATOLOGY
End: 2025-05-20

## 2025-05-20 DIAGNOSIS — L24.4 IRRITANT CONTACT DERMATITIS DUE TO DRUGS IN CONTACT WITH SKIN: ICD-10-CM

## 2025-05-20 PROCEDURE — ? PRESCRIPTION MEDICATION MANAGEMENT

## 2025-05-20 PROCEDURE — ? COUNSELING

## 2025-05-20 PROCEDURE — 99213 OFFICE O/P EST LOW 20 MIN: CPT

## 2025-05-20 PROCEDURE — ? OTC TREATMENT REGIMEN

## 2025-05-20 ASSESSMENT — LOCATION SIMPLE DESCRIPTION DERM: LOCATION SIMPLE: RIGHT NOSE

## 2025-05-20 ASSESSMENT — LOCATION DETAILED DESCRIPTION DERM: LOCATION DETAILED: RIGHT NASAL ALA

## 2025-05-20 ASSESSMENT — LOCATION ZONE DERM: LOCATION ZONE: NOSE

## 2025-05-20 NOTE — PROCEDURE: OTC TREATMENT REGIMEN
Patient Specific Otc Recommendations (Will Not Stick From Patient To Patient): Vaseline Bid
Detail Level: Zone

## 2025-06-04 ENCOUNTER — APPOINTMENT (OUTPATIENT)
Dept: URBAN - METROPOLITAN AREA CLINIC 156 | Facility: CLINIC | Age: OVER 89
Setting detail: DERMATOLOGY
End: 2025-06-04

## 2025-06-04 DIAGNOSIS — L57.0 ACTINIC KERATOSIS: ICD-10-CM

## 2025-06-04 DIAGNOSIS — L82.1 OTHER SEBORRHEIC KERATOSIS: ICD-10-CM

## 2025-06-04 DIAGNOSIS — L85.3 XEROSIS CUTIS: ICD-10-CM | Status: INADEQUATELY CONTROLLED

## 2025-06-04 PROBLEM — D48.5 NEOPLASM OF UNCERTAIN BEHAVIOR OF SKIN: Status: ACTIVE | Noted: 2025-06-04

## 2025-06-04 PROCEDURE — ? PRESCRIPTION MEDICATION MANAGEMENT

## 2025-06-04 PROCEDURE — ? PRESCRIPTION

## 2025-06-04 PROCEDURE — ? LIQUID NITROGEN

## 2025-06-04 PROCEDURE — ? BIOPSY BY SHAVE METHOD

## 2025-06-04 PROCEDURE — ? COUNSELING

## 2025-06-04 RX ORDER — TRIAMCINOLONE ACETONIDE 1 MG/G
CREAM TOPICAL
Qty: 80 | Refills: 3 | Status: ERX | COMMUNITY
Start: 2025-06-04

## 2025-06-04 RX ADMIN — TRIAMCINOLONE ACETONIDE: 1 CREAM TOPICAL at 00:00

## 2025-06-04 ASSESSMENT — LOCATION SIMPLE DESCRIPTION DERM
LOCATION SIMPLE: LEFT CHEEK
LOCATION SIMPLE: LEFT UPPER BACK
LOCATION SIMPLE: RIGHT ZYGOMA
LOCATION SIMPLE: RIGHT CHEEK

## 2025-06-04 ASSESSMENT — LOCATION DETAILED DESCRIPTION DERM
LOCATION DETAILED: LEFT MEDIAL UPPER BACK
LOCATION DETAILED: RIGHT MID PREAURICULAR CHEEK
LOCATION DETAILED: RIGHT MEDIAL MALAR CHEEK
LOCATION DETAILED: RIGHT SUPERIOR CENTRAL MALAR CHEEK
LOCATION DETAILED: LEFT SUPERIOR LATERAL MALAR CHEEK
LOCATION DETAILED: RIGHT CENTRAL ZYGOMA

## 2025-06-04 ASSESSMENT — LOCATION ZONE DERM
LOCATION ZONE: FACE
LOCATION ZONE: TRUNK

## 2025-06-04 NOTE — PROCEDURE: LIQUID NITROGEN
Detail Level: Simple
Duration Of Freeze Thaw-Cycle (Seconds): 0
Show Applicator Variable?: Yes
Render Note In Bullet Format When Appropriate: No
Number Of Freeze-Thaw Cycles: 1 freeze-thaw cycle
Post-Care Instructions: I reviewed with the patient in detail post-care instructions. Patient is to wear sunprotection, and avoid picking at any of the treated lesions. Pt may apply Vaseline to crusted or scabbing areas.
Consent: The patient's consent was obtained including but not limited to risks of crusting, scabbing, blistering, scarring, darker or lighter pigmentary change, recurrence, incomplete removal and infection.

## 2025-06-04 NOTE — PROCEDURE: BIOPSY BY SHAVE METHOD
Body Location Override (Optional - Billing Will Still Be Based On Selected Body Map Location If Applicable): left low cheek
Detail Level: Detailed
Depth Of Biopsy: dermis
Was A Bandage Applied: Yes
Size Of Lesion In Cm: 0.5
X Size Of Lesion In Cm: 0
Biopsy Type: H and E
Biopsy Method: double edge Personna blade
Anesthesia Type: 2% lidocaine without epinephrine
Hemostasis: Electrocautery
Wound Care: Vaseline
Dressing: Band-Aid
Destruction After The Procedure: No
Type Of Destruction Used: Curettage
Curettage Text: The wound bed was treated with curettage after the biopsy was performed.
Cryotherapy Text: The wound bed was treated with cryotherapy after the biopsy was performed.
Electrodesiccation Text: The wound bed was treated with electrodesiccation after the biopsy was performed.
Electrodesiccation And Curettage Text: The wound bed was treated with electrodesiccation and curettage after the biopsy was performed.
Silver Nitrate Text: The wound bed was treated with silver nitrate after the biopsy was performed.
Lab: 6
Medical Necessity Information: It is in your best interest to select a reason for this procedure from the list below. All of these items fulfill various CMS LCD requirements except the new and changing color options.
Consent: Written consent was obtained and risks were reviewed including but not limited to scarring, infection, bleeding, scabbing, incomplete removal, nerve damage and allergy to anesthesia.
Post-Care Instructions: I reviewed with the patient in detail post-care instructions. Patient is to keep the biopsy site dry overnight, and then apply bacitracin twice daily until healed. Patient may apply hydrogen peroxide soaks to remove any crusting.
Notification Instructions: Patient will be notified of biopsy results. However, patient instructed to call the office if not contacted within 2 weeks.
Billing Type: Third-Party Bill
Information: Selecting Yes will display possible errors in your note based on the variables you have selected. This validation is only offered as a suggestion for you. PLEASE NOTE THAT THE VALIDATION TEXT WILL BE REMOVED WHEN YOU FINALIZE YOUR NOTE. IF YOU WANT TO FAX A PRELIMINARY NOTE YOU WILL NEED TO TOGGLE THIS TO 'NO' IF YOU DO NOT WANT IT IN YOUR FAXED NOTE.

## 2025-06-04 NOTE — PROCEDURE: PRESCRIPTION MEDICATION MANAGEMENT
Render In Strict Bullet Format?: No
Initiate Treatment: Triamcinolone BID
Detail Level: Zone
Plan: Sarna OTC

## 2025-07-09 ENCOUNTER — APPOINTMENT (OUTPATIENT)
Dept: URBAN - METROPOLITAN AREA CLINIC 156 | Facility: CLINIC | Age: OVER 89
Setting detail: DERMATOLOGY
End: 2025-07-09

## 2025-07-09 DIAGNOSIS — L57.0 ACTINIC KERATOSIS: ICD-10-CM

## 2025-07-09 PROCEDURE — ? LIQUID NITROGEN

## 2025-07-09 PROCEDURE — ? PATHOLOGY DISCUSSION

## 2025-07-09 PROCEDURE — ? COUNSELING

## 2025-07-09 ASSESSMENT — LOCATION DETAILED DESCRIPTION DERM: LOCATION DETAILED: LEFT CENTRAL BUCCAL CHEEK

## 2025-07-09 ASSESSMENT — LOCATION SIMPLE DESCRIPTION DERM: LOCATION SIMPLE: LEFT CHEEK

## 2025-07-09 ASSESSMENT — LOCATION ZONE DERM: LOCATION ZONE: FACE

## 2025-07-09 NOTE — PROCEDURE: LIQUID NITROGEN
Number Of Freeze-Thaw Cycles: 1 freeze-thaw cycle
Post-Care Instructions: I reviewed with the patient in detail post-care instructions. Patient is to wear sunprotection, and avoid picking at any of the treated lesions. Pt may apply Vaseline to crusted or scabbing areas.
Consent: The patient's consent was obtained including but not limited to risks of crusting, scabbing, blistering, scarring, darker or lighter pigmentary change, recurrence, incomplete removal and infection.
Detail Level: Simple
Render Post-Care Instructions In Note?: no
Show Applicator Variable?: Yes
Duration Of Freeze Thaw-Cycle (Seconds): 0